# Patient Record
Sex: MALE | Race: WHITE
[De-identification: names, ages, dates, MRNs, and addresses within clinical notes are randomized per-mention and may not be internally consistent; named-entity substitution may affect disease eponyms.]

---

## 2017-04-11 ENCOUNTER — HOSPITAL ENCOUNTER (OUTPATIENT)
Dept: HOSPITAL 45 - X.SURG | Age: 82
Discharge: HOME | End: 2017-04-11
Attending: OPHTHALMOLOGY
Payer: COMMERCIAL

## 2017-04-11 VITALS
BODY MASS INDEX: 29.79 KG/M2 | WEIGHT: 185.39 LBS | WEIGHT: 185.39 LBS | HEIGHT: 65.98 IN | HEIGHT: 65.98 IN | BODY MASS INDEX: 29.79 KG/M2

## 2017-04-11 VITALS — TEMPERATURE: 97.52 F

## 2017-04-11 VITALS — HEART RATE: 66 BPM | DIASTOLIC BLOOD PRESSURE: 66 MMHG | SYSTOLIC BLOOD PRESSURE: 167 MMHG | OXYGEN SATURATION: 94 %

## 2017-04-11 DIAGNOSIS — Z90.89: ICD-10-CM

## 2017-04-11 DIAGNOSIS — H26.9: Primary | ICD-10-CM

## 2017-04-11 DIAGNOSIS — Z96.653: ICD-10-CM

## 2017-04-11 DIAGNOSIS — I10: ICD-10-CM

## 2017-04-11 RX ADMIN — CYCLOPENTOLATE HYDROCHLORIDE SCH DROP: 10 SOLUTION/ DROPS OPHTHALMIC at 07:59

## 2017-04-11 RX ADMIN — GATIFLOXACIN SCH DROP: 5 SOLUTION/ DROPS OPHTHALMIC at 08:01

## 2017-04-11 RX ADMIN — GATIFLOXACIN SCH DROP: 5 SOLUTION/ DROPS OPHTHALMIC at 08:10

## 2017-04-11 RX ADMIN — CYCLOPENTOLATE HYDROCHLORIDE SCH DROP: 10 SOLUTION/ DROPS OPHTHALMIC at 08:05

## 2017-04-11 RX ADMIN — KETOROLAC TROMETHAMINE SCH DROP: 5 SOLUTION OPHTHALMIC at 08:00

## 2017-04-11 RX ADMIN — KETOROLAC TROMETHAMINE SCH DROP: 5 SOLUTION OPHTHALMIC at 08:06

## 2017-04-11 RX ADMIN — TROPICAMIDE SCH DROP: 10 SOLUTION/ DROPS OPHTHALMIC at 07:58

## 2017-04-11 RX ADMIN — PHENYLEPHRINE HYDROCHLORIDE SCH DROP: 25 SOLUTION/ DROPS OPHTHALMIC at 08:03

## 2017-04-11 RX ADMIN — TROPICAMIDE SCH DROP: 10 SOLUTION/ DROPS OPHTHALMIC at 08:04

## 2017-04-11 RX ADMIN — PHENYLEPHRINE HYDROCHLORIDE SCH DROP: 25 SOLUTION/ DROPS OPHTHALMIC at 07:57

## 2017-04-11 NOTE — ANESTHESIA PROGRESS NT - [facility]
Anesthesia Post Op Note


Date & Time


Apr 11, 2017 at 10:27





Vital Signs


Pain Intensity:  0





 Vital Signs Past 12 Hours








  Date Time  Temp Pulse Resp B/P Pulse Ox O2 Delivery O2 Flow Rate FiO2


 


4/11/17 09:53  66 16 167/66 94 Room Air  


 


4/11/17 09:26 36.4 61 14 152/86 94 Room Air  


 


4/11/17 07:55 36.5 65 18 164/99 95 Room Air  











Notes


Mental Status:  alert / awake / arousable, participated in evaluation


Pt Amnestic to Procedure:  Yes


Nausea / Vomiting:  adequately controlled


Pain:  adequately controlled


Airway Patency, RR, SpO2:  stable & adequate


BP & HR:  stable & adequate


Hydration State:  stable & adequate


Anesthetic Complications:  no major complications apparent

## 2017-04-11 NOTE — DISCHARGE INSTRUCTIONS-SURGCTR
Discharge Instructions


Date of Service


Apr 11, 2017.





Visit


Reason for Visit:  Cataract Left Eye





Discharge


Discharge Diagnosis / Problem:  cataract





Discharge Goals


Goal(s):  Improve function





Activity Recommendations


Activity Limitations:  per Instructions/Follow-up section





Anesthesia


.





Post Anesthesia Instructions:





If you have had General Anesthesia or IV Sedation:





*  Do not drive today.


*  Resume driving when surgeon permits.


*  Do not make important decisions or sign legal documents today.


*  Call surgeon for:





   1.  Temperature elevations greater than 101 degrees F.


   2.  Uncontrollable pain.


   3.  Excessive bleeding.


   4.  Persistent nausea and vomiting.


   5.  Medication intolerance (nausea, vomiting or rash).





*  For nausea and vomiting use only clear liquids such as: tea, soda, bouillon 

until nausea subsides, then gradually increase diet as tolerated.





*  If you have any concerns or questions, call your surgeon's office.  If 

physician is unavailable and it is an emergency, call 911 or go to the nearest 

emergency room.





.





Instructions / Follow-Up


Instructions / Follow-Up





ACTIVITY RECOMMENDATIONS:





*  No strenuous lifting, jogging or running for 4 days





*  No swimming or yard work for 1 week.





*  Limited bending is permitted, such as putting on shoes.








RETURN TO SCHOOL/WORK:





No work until seen by physician in office.








MEDICATIONS:





Resume previous medications unless instructed otherwise by your surgeon.  This


includes eye drops for glaucoma.





Zymaxid/Gatifloxacin (tan cap) - one drop every 2 hours until bedtime





Nevanac/Ilevro/Prolensa/Ketorolac (gray cap) - one drop every 4 hours until 

bedtime





Prednisolone (white/pink cap, SHAKE WELL) - one drop every 2 hours until bedtime





Starting tomorrow - all 3 drops every 4 hours until seen in the office





Optive drops - as needed for discomfort








SPECIAL CARE INSTRUCTIONS:





*  Wear eyeshield when sleeping, for four nights.





*  You may wear your own glasses or sunglasses while awake.





*  You may read or watch TV





*  You may shower and wash your face, but be gentle around the eye and pat dry.





*  Blurry vision and mild irritation are normal.





*  Call office if pain is more severe or vision becomes dark at [phone].








FOLLOW UP VISIT:





Follow-up with Dr Boateng tomorrow.





Diet Recommendations


Home Diet:  resume previous diet





Procedures


Procedures Performed:  


Left Cataract Phacoemulsification With Intraocular Lens Implant





Pending Studies


Studies pending at discharge:  no





Medical Emergencies








.


Who to Call and When:





Medical Emergencies:  If at any time you feel your situation is an emergency, 

please call 911 immediately.





.





Non-Emergent Contact


Non-Emergency issues call your:  Ophthalmologist





.


.





"Provider Documentation" section prepared by Elijah Boateng.

## 2017-04-11 NOTE — [facility] OPERATIVE REPORT
Operative Report


Date of Service


Apr 11, 2017.





Operative Report


1.  PREOPERATIVE DIAGNOSIS:  Cataract of the left eye.





2.  POSTOPERATIVE DIAGNOSIS:  Same.





3.  PROCEDURE:  Phacoemulsification with intraocular lens implantation of the 

left eye.  





SURGEON:  Dr. Elijah Boateng.  ANESTHESIA:  Topical Lidocaine gel, 1% Non-

Preserved intracameral Lidocaine, and monitored intravenous sedation.  





INDICATIONS FOR THE PROCEDURE:  The patient is a 81 - year-old male with a 

history of cataract of the left eye causing significant visual impairment.  The 

details of the proposed procedure were explained to the patient who asked 

appropriate questions and following discussion of all risks, benefits and 

alternatives agreed to have the procedure done.  





4.  OPERATION AND FINDINGS:  





DESCRIPTION OF PROCEDURE:  After informed consent was obtained, the patient was 

brought to the Operating Room at the [facility].  The 

patient was placed in a supine position and then the left eye was prepped and 

draped in the usual sterile fashion for intraocular surgery.  A drop of topical 

Lidocaine gel was placed in the operative eye.  A wire lid speculum was then 

placed in the fornices.  A corneal paracentesis was then created temporally.  

The Non-Preserved Lidocaine was then instilled into the anterior chamber.  The 

anterior chamber was then pressurized with viscoelastic.  A 2.0 mm clear 

corneal incision was then created temporally.  A cystotome was inserted into 

the anterior chamber and used to create a tear in the anterior lens capsule.  

This capsular tear was then used to create a small flap and the flap was 

dragged in a counterclockwise direction in order to create a continuous 

curvilinear capsulorrhexis.  Hydrodissection was accomplished with balanced 

salt solution.  Phacoemulsification of the lens nucleus was then performed in a 

standard divide-and-conquer technique.  The phaco time was 23 seconds with an 

average power of 10 %.  The remaining cortical material was removed using 

irrigation aspiration.  The capsular bag was then filled with viscoelastic.  A 

Bausch & Lomb MI60L +22.5 diopters lens was then loaded into the injector and 

injected into the capsular bag.  The remaining viscoelastic was removed with 

the irrigation aspiration handpiece.  The wound was hydrated and then checked 

and found to be watertight.  The intraocular pressure was checked and found to 

be adequate.  The wire lid speculum was removed and the patient's face was 

cleaned and dried.  TobraDex ointment was placed in the inferior fornix.  The 

patient was discharged to the Recovery Room having tolerated the procedure 

well.  There were no complications.  The patient will be seen tomorrow in the 

office for follow-up.


I attest to the content of the Intraoperative Record and any orders documented 

therein.  Any exceptions are noted below.

## 2017-05-02 ENCOUNTER — HOSPITAL ENCOUNTER (OUTPATIENT)
Dept: HOSPITAL 45 - X.SURG | Age: 82
Discharge: HOME | End: 2017-05-02
Attending: OPHTHALMOLOGY
Payer: COMMERCIAL

## 2017-05-02 VITALS — TEMPERATURE: 97.7 F

## 2017-05-02 VITALS
HEIGHT: 65.98 IN | BODY MASS INDEX: 29.79 KG/M2 | WEIGHT: 185.39 LBS | HEIGHT: 65.98 IN | BODY MASS INDEX: 29.79 KG/M2 | WEIGHT: 185.39 LBS

## 2017-05-02 VITALS — HEART RATE: 59 BPM | SYSTOLIC BLOOD PRESSURE: 134 MMHG | DIASTOLIC BLOOD PRESSURE: 82 MMHG | OXYGEN SATURATION: 95 %

## 2017-05-02 DIAGNOSIS — H26.9: Primary | ICD-10-CM

## 2017-05-02 RX ADMIN — CYCLOPENTOLATE HYDROCHLORIDE SCH DROP: 10 SOLUTION/ DROPS OPHTHALMIC at 09:53

## 2017-05-02 RX ADMIN — GATIFLOXACIN SCH DROP: 5 SOLUTION/ DROPS OPHTHALMIC at 10:02

## 2017-05-02 RX ADMIN — PHENYLEPHRINE HYDROCHLORIDE SCH DROP: 25 SOLUTION/ DROPS OPHTHALMIC at 09:51

## 2017-05-02 RX ADMIN — TROPICAMIDE SCH DROP: 10 SOLUTION/ DROPS OPHTHALMIC at 09:52

## 2017-05-02 RX ADMIN — GATIFLOXACIN SCH DROP: 5 SOLUTION/ DROPS OPHTHALMIC at 09:55

## 2017-05-02 RX ADMIN — KETOROLAC TROMETHAMINE SCH DROP: 5 SOLUTION OPHTHALMIC at 09:59

## 2017-05-02 RX ADMIN — PHENYLEPHRINE HYDROCHLORIDE SCH DROP: 25 SOLUTION/ DROPS OPHTHALMIC at 09:56

## 2017-05-02 RX ADMIN — KETOROLAC TROMETHAMINE SCH DROP: 5 SOLUTION OPHTHALMIC at 09:54

## 2017-05-02 RX ADMIN — CYCLOPENTOLATE HYDROCHLORIDE SCH DROP: 10 SOLUTION/ DROPS OPHTHALMIC at 09:58

## 2017-05-02 RX ADMIN — TROPICAMIDE SCH DROP: 10 SOLUTION/ DROPS OPHTHALMIC at 09:57

## 2017-05-02 NOTE — [facility] OPERATIVE REPORT
Operative Report


Date of Service


May 2, 2017.





Operative Report


1.  PREOPERATIVE DIAGNOSIS:  Cataract of the right eye.





2.  POSTOPERATIVE DIAGNOSIS:  Same.





3.  PROCEDURE:  Phacoemulsification with intraocular lens implantation of the 

right eye.  





SURGEON:  Dr. Elijah Boateng.  ANESTHESIA:  Topical Lidocaine gel, 1% Non-

Preserved intracameral Lidocaine, and monitored intravenous sedation.  





INDICATIONS FOR THE PROCEDURE:  The patient is a 81 - year-old male with a 

history of cataract of the right eye causing significant visual impairment.  

The details of the proposed procedure were explained to the patient who asked 

appropriate questions and following discussion of all risks, benefits and 

alternatives agreed to have the procedure done.  





4.  OPERATION AND FINDINGS:  





DESCRIPTION OF PROCEDURE:  After informed consent was obtained, the patient was 

brought to the Operating Room at the [facility].  The 

patient was placed in a supine position and then the right eye was prepped and 

draped in the usual sterile fashion for intraocular surgery.  A drop of topical 

Lidocaine gel was placed in the operative eye.  A wire lid speculum was then 

placed in the fornices.  A corneal paracentesis was then created temporally.  

The Non-Preserved Lidocaine was then instilled into the anterior chamber.  The 

anterior chamber was then pressurized with viscoelastic.  A 2.0 mm clear 

corneal incision was then created temporally.  A cystotome was inserted into 

the anterior chamber and used to create a tear in the anterior lens capsule.  

This capsular tear was then used to create a small flap and the flap was 

dragged in a counterclockwise direction in order to create a continuous 

curvilinear capsulorrhexis.  Hydrodissection was accomplished with balanced 

salt solution.  Phacoemulsification of the lens nucleus was then performed in a 

standard divide-and-conquer technique.  The phaco time was 25 seconds with an 

average power of 10 %.  The remaining cortical material was removed using 

irrigation aspiration.  The capsular bag was then filled with viscoelastic.  A 

Bausch & Lomb MI60L +22.5 diopters lens was then loaded into the injector and 

injected into the capsular bag.  The remaining viscoelastic was removed with 

the irrigation aspiration handpiece.  The wound was hydrated and then checked 

and found to be watertight.  The intraocular pressure was checked and found to 

be adequate.  The wire lid speculum was removed and the patient's face was 

cleaned and dried.  TobraDex ointment was placed in the inferior fornix.  The 

patient was discharged to the Recovery Room having tolerated the procedure 

well.  There were no complications.  The patient will be seen tomorrow in the 

office for follow-up.


I attest to the content of the Intraoperative Record and any orders documented 

therein.  Any exceptions are noted below.

## 2017-05-02 NOTE — DISCHARGE INSTRUCTIONS-SURGCTR
Discharge Instructions


Date of Service


May 2, 2017.





Visit


Reason for Visit:  Cataract Right Eye





Discharge


Discharge Diagnosis / Problem:  cataract





Discharge Goals


Goal(s):  Improve function





Activity Recommendations


Activity Limitations:  per Instructions/Follow-up section





Anesthesia


.





Post Anesthesia Instructions:





If you have had General Anesthesia or IV Sedation:





*  Do not drive today.


*  Resume driving when surgeon permits.


*  Do not make important decisions or sign legal documents today.


*  Call surgeon for:





   1.  Temperature elevations greater than 101 degrees F.


   2.  Uncontrollable pain.


   3.  Excessive bleeding.


   4.  Persistent nausea and vomiting.


   5.  Medication intolerance (nausea, vomiting or rash).





*  For nausea and vomiting use only clear liquids such as: tea, soda, bouillon 

until nausea subsides, then gradually increase diet as tolerated.





*  If you have any concerns or questions, call your surgeon's office.  If 

physician is unavailable and it is an emergency, call 911 or go to the nearest 

emergency room.





.





Instructions / Follow-Up


Instructions / Follow-Up





ACTIVITY RECOMMENDATIONS:





*  No strenuous lifting, jogging or running for 4 days





*  No swimming or yard work for 1 week.





*  Limited bending is permitted, such as putting on shoes.








RETURN TO SCHOOL/WORK:





No work until seen by physician in office.








MEDICATIONS:





Resume previous medications unless instructed otherwise by your surgeon.  This


includes eye drops for glaucoma.





Zymaxid/Gatifloxacin (tan cap) - one drop every 2 hours until bedtime





Nevanac/Ilevro/Prolensa/Ketorolac (gray cap) - one drop every 4 hours until 

bedtime





Prednisolone (white/pink cap, SHAKE WELL) - one drop every 2 hours until bedtime





Starting tomorrow - all 3 drops every 4 hours until seen in the office





Optive drops - as needed for discomfort








SPECIAL CARE INSTRUCTIONS:





*  Wear eyeshield when sleeping, for four nights.





*  You may wear your own glasses or sunglasses while awake.





*  You may read or watch TV





*  You may shower and wash your face, but be gentle around the eye and pat dry.





*  Blurry vision and mild irritation are normal.





*  Call office if pain is more severe or vision becomes dark at [phone].








FOLLOW UP VISIT:





Follow-up with Dr Boateng tomorrow.





Diet Recommendations


Home Diet:  resume previous diet





Procedures


Procedures Performed:  


Right Eye Cataract Phacoemuslification With Intraocular Lens Implant





Pending Studies


Studies pending at discharge:  no





Medical Emergencies








.


Who to Call and When:





Medical Emergencies:  If at any time you feel your situation is an emergency, 

please call 911 immediately.





.





Non-Emergent Contact


Non-Emergency issues call your:  Ophthalmologist





.


.





"Provider Documentation" section prepared by Elijah Boateng.








.

## 2018-03-13 ENCOUNTER — HOSPITAL ENCOUNTER (EMERGENCY)
Dept: HOSPITAL 45 - C.EDB | Age: 83
Discharge: HOME | End: 2018-03-13
Payer: COMMERCIAL

## 2018-03-13 VITALS
WEIGHT: 194.45 LBS | HEIGHT: 67.01 IN | HEIGHT: 67.01 IN | WEIGHT: 194.45 LBS | BODY MASS INDEX: 30.52 KG/M2 | BODY MASS INDEX: 30.52 KG/M2

## 2018-03-13 VITALS — TEMPERATURE: 97.34 F

## 2018-03-13 VITALS — OXYGEN SATURATION: 99 % | HEART RATE: 77 BPM | SYSTOLIC BLOOD PRESSURE: 121 MMHG | DIASTOLIC BLOOD PRESSURE: 88 MMHG

## 2018-03-13 DIAGNOSIS — I12.9: ICD-10-CM

## 2018-03-13 DIAGNOSIS — Z88.5: ICD-10-CM

## 2018-03-13 DIAGNOSIS — M19.90: ICD-10-CM

## 2018-03-13 DIAGNOSIS — E78.5: ICD-10-CM

## 2018-03-13 DIAGNOSIS — Z88.8: ICD-10-CM

## 2018-03-13 DIAGNOSIS — Z79.52: ICD-10-CM

## 2018-03-13 DIAGNOSIS — N18.3: ICD-10-CM

## 2018-03-13 DIAGNOSIS — Z96.653: ICD-10-CM

## 2018-03-13 DIAGNOSIS — M32.9: ICD-10-CM

## 2018-03-13 DIAGNOSIS — I35.0: ICD-10-CM

## 2018-03-13 DIAGNOSIS — G89.29: ICD-10-CM

## 2018-03-13 DIAGNOSIS — Z79.899: ICD-10-CM
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
## 2018-03-13 DIAGNOSIS — S39.92XA: Primary | ICD-10-CM
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
## 2018-03-13 NOTE — EMERGENCY ROOM VISIT NOTE
ED Visit Note


First contact with patient:  12:25


I have seen and examined this patient with Cesia Shaw and generally agree 

with the treatment plan as discussed.


Problem List


Medical Problems:


(1) Aortic stenosis


Status: Chronic  





(2) Chronic back pain


Status: Chronic  





(3) CKD (chronic kidney disease), stage III


Status: Chronic  





(4) History of steroid therapy


Status: Chronic  





(5) Hyperlipidemia


Status: Chronic  





(6) Hypertension


Status: Chronic  





(7) Osteoarthritis


Status: Chronic  





(8) Pericarditis


Status: Resolved  





(9) Systemic lupus erythematosus


Status: Chronic  





Surgical Problems:


(1) History of back surgery


Status: Chronic  





(2) S/P appendectomy


Status: Chronic  





(3) S/p total knee replacement, bilateral


Status: Chronic  











Current/Historical Medications


Scheduled


Bromfenac Sodium (Ophth) (Prolensa), 1 DROP OPL DAILY


Carvedilol (Coreg), 3.125 MG PO BID


Cholecalciferol (Vitamin D-3), 400 MG PO QAM


Duloxetine HCl (Cymbalta), 1 CAP PO HS


Folic Acid (Folic Acid), 400 MCG PO QAM


Prednisolone Acetate (Ophth) (Pred Forte 1% Oph), 1 DROPS OPL TID


Prednisone (Prednisone), 2 TAB PO QAM


Rosuvastatin Calcium (Crestor), 20 MG PO HS


Sennosides-Docusate Sodium (Senna-S), 3 TABS PO QPM


Terazosin Hcl (Hytrin), 2 MG PO HS





Scheduled PRN


Artificial Tears (Artificial Tears), 1 DROPS OP QID PRN


Celecoxib (Celebrex), 1 CAP PO QAM PRN for Pain


Hydrocodone/Acetaminophen 5MG/325MG (Norco 5MG/325MG), 1-2 TAB PO Q4 PRN for 

Pain


Lorazepam (Lorazepam), 1 MG PO Q6H PRN for Anxiety


Thiamine Hcl (Vitamin B-1), 50 MG PO QAM PRN for PRN





Allergies


Coded Allergies:  


     Oxycodone (Verified  Allergy, Unknown, ITCHY, Per pt-can take Vicodin, 5/2/ 17)


     Piroxicam (Verified  Adverse Reaction, Intermediate, CAUSED ULCER, 5/2/17)





Vital Signs











  Date Time  Temp Pulse Resp B/P (MAP) Pulse Ox O2 Delivery O2 Flow Rate FiO2


 


3/13/18 12:01 36.3 80 20 128/71 93 Room Air  











Departure Information


Referrals


No Doctor, Assigned (PCP)





Patient Instructions


Formerly [facility], [facility]–1983

## 2018-03-13 NOTE — DIAGNOSTIC IMAGING REPORT
L-SPINE MIN 4 VIEWS ROUTINE, THORACIC SPINE 3 VIEWS ROUTINE



HISTORY:  82 years-old Male fall, eval hardware acute back pain status post

fall. History of prior thoracic and lumbar spine fusion



COMPARISON: Lumbar spine radiographs 8/27/2015



TECHNIQUE: 5 views of the lumbar spine and 3 views of the thoracic spine



FINDINGS: 



THORACIC:

 12 thoracic type rib-bearing vertebral segments are present. Fusion hardware

extends from T11-L5. Severe intervertebral disc space narrowing at T8-T9.

Multilevel intervertebral disc space narrowing and endplate spurring throughout

the imaged cervical and thoracic spine. No acute fracture or subluxation. The

imaged hardware appears intact. Heart appears enlarged. Mild right hemidiaphragm

elevation. Atherosclerosis of the aorta.



LUMBAR:

Posterior jesica screw fusion hardware extends from T11-L5. Discectomy changes are

noted at L2-L3, L3-L4 and L5-S1. There is 8 mm anterolisthesis L5 on S1 which

appears unchanged from fluoroscopic images 8/27/2015. Alignment is otherwise

satisfactory. No acute fracture or subluxation is identified. The fusion

hardware appears intact. Multilevel endplate spurring and facet arthrosis.

Indeterminate round 10 mm calcification projects over the left upper abdomen.

Moderate stool volume throughout the colon. Atherosclerosis and tortuosity of

the aorta.



 IMPRESSION: 

1. No acute fracture or subluxation of the thoracic or lumbar spine.

2. Posterior jesica and screw fusion hardware of the thoracolumbar spine with

unchanged alignment. No evidence of hardware complication.







The above report was generated using voice recognition software. It may contain

grammatical, syntax or spelling errors.







Electronically signed by:  Ricardo Hinojosa M.D.

3/13/2018 2:19 PM



Dictated Date/Time:  3/13/2018 2:13 PM

## 2018-03-13 NOTE — EMERGENCY ROOM VISIT NOTE
ED Visit Note


First contact with patient:  12:25


CHIEF COMPLAINT:  Low back pain





HISTORY OF PRESENT ILLNESS:  This 82-year-old male patient presents to the 

emergency department by private vehicle complaining of pain in the low back 

which began approximately 10 days ago after a fall.  The patient states that he 

slipped on some ice and fell straight back onto his lower back.  He denies 

hitting his head and did not have loss of consciousness, denies any other 

injuries associated with the fall.  The pain has been gradual in onset, is now 

constant and worse with movement.  The patient states his pain became more 

severe last night, stating he woke up in the middle of the night having bad 

muscle spasms causing his pain.  The patient notes the pain as throbbing and 

spasm-like and a 5/10 currently.  The patient has taken Norco with good relief 

of the pain, he states he has this for chronic pain.  The patient denies any 

loss of control of their bowel or bladder functions.  There has been no leg 

numbness or weakness, and no change in sensation.  No nausea or vomiting or 

abdominal pain.  No chest pain or shortness of breath.  The patient has had 

multiple prior back surgeries, with lumbar and thoracic fusions.  No dysuria or 

increased urinary frequency.





REVIEW OF SYSTEMS:   A review of systems was performed with positives and 

pertinent negatives listed in the history of present illness. All other systems 

were reviewed and are negative.





ALLERGIES: Reviewed in chart





MEDICATIONS: Reviewed in chart





PMH: Reviewed in chart





SOCIAL HISTORY:    Lives at home.  Denies tobacco use, alcohol or recreational 

drug use.


  


PHYSICAL EXAM: 


VITALS: Vitals are noted on the nurse's note and reviewed by myself.  Vital 

signs stable.


GENERAL: Pleasant and cooperative, in no acute distress but does appear in some 

pain, non-diaphoretic, well-developed well-nourished.


SKIN: The skin was without rashes, erythema, edema, or bruising.  Capillary 

refill less than 2 seconds.    


NECK: Supple without nuchal rigidity.  No cervical spine tenderness.  No 

paraspinous muscle tenderness.    


HEART: Regular rate and rhythm without murmurs gallops or rubs.


LUNGS: Clear to auscultation bilaterally without wheezes, rales or rhonchi.  


ABDOMEN: Positive bowel sounds x 4.  Normal tympanic percussion.  Soft, 

nontender, without masses or organomegaly.  Field sign negative.


MUSCULOSKELETAL: No muscle atrophy, erythema, or edema noted of the back.  

There is no tenderness over the lumbar spinous processes.  There is mild 

tenderness over the paraspinous muscles bilaterally.  There is no tenderness 

over the thoracic spine, but mild tenderness of the drastic paraspinous 

muscles.  There are some muscle spasms present.  The patient is slow to move 

around with maximum tenderness with bending and twisting the back.  Negative 

straight leg raise test  Bilaterally.


NEURO: Patient was alert and oriented to person place and time.  Normal 

sensation to light and sharp touch.  Deep tendon reflexes 2+ in the lower 

extremities.  Dorsalis pedis pulse 2+ bilaterally.  Strength 5/5 and equal in 

the bilateral lower extremities.





EMERGENCY DEPARTMENT COURSE: I examined the patient.  Differential diagnosis 

includes lumbar sprain/strain, contusion, vertebral fracture, subluxation, 

disruption of surgical hardware, among others.  X-rays of the thoracic and 

lumbar spine were obtained, these were reviewed by myself and radiologist and 

show no acute abnormalities and intact surgical hardware.  The patient declined 

anything for pain, stating he will continue taking his Norco at home as needed.

  He was encouraged to follow-up with his orthopedic spine surgeon for further 

management of his back pain.  He was also given strict return precautions 

should his symptoms worsen, he verbalized understanding.  Patient was 

discharged home in stable condition and ambulatory.





Patient was discussed with Dr. Trejo, who also evaluated the patient and 

agrees with my assessment and plan.


Problem List


Medical Problems:


(1) Aortic stenosis


Status: Chronic  





(2) Chronic back pain


Status: Chronic  





(3) CKD (chronic kidney disease), stage III


Status: Chronic  





(4) History of steroid therapy


Status: Chronic  





(5) Hyperlipidemia


Status: Chronic  





(6) Hypertension


Status: Chronic  





(7) Osteoarthritis


Status: Chronic  





(8) Pericarditis


Status: Resolved  





(9) Systemic lupus erythematosus


Status: Chronic  





Surgical Problems:


(1) History of back surgery


Status: Chronic  





(2) S/P appendectomy


Status: Chronic  





(3) S/p total knee replacement, bilateral


Status: Chronic  











Current/Historical Medications


Scheduled


Carvedilol (Coreg), 3.125 MG PO BID


Cholecalciferol (Vitamin D-3), 400 MG PO QAM


Duloxetine HCl (Cymbalta), 20 MG PO HS


Folic Acid (Folic Acid), 400 MCG PO QAM


Methotrexate (Methotrexate), 7.5 MG PO WK


Prednisolone Acetate (Ophth) (Pred Forte 1% Oph), 1 DROPS OPL TID


Prednisone (Prednisone), 5 MG PO QAM


Rosuvastatin Calcium (Crestor), 20 MG PO HS


Sennosides-Docusate Sodium (Senna-S), 3 TABS PO QPM


Terazosin Hcl (Hytrin), 2 MG PO HS





Scheduled PRN


Lorazepam (Lorazepam), 1 MG PO Q6H PRN for Anxiety


Thiamine Hcl (Vitamin B-1), 50 MG PO QAM PRN for PRN





Allergies


Coded Allergies:  


     Oxycodone (Verified  Allergy, Unknown, ITCHY, Per pt-can take Vicodin, 3/13

/18)


     Piroxicam (Verified  Adverse Reaction, Intermediate, CAUSED ULCER, 3/13/18)





Vital Signs











  Date Time  Temp Pulse Resp B/P (MAP) Pulse Ox O2 Delivery O2 Flow Rate FiO2


 


3/13/18 14:30  77 18 121/88 99 Room Air  


 


3/13/18 12:01 36.3 80 20 128/71 93 Room Air  











Departure Information


Impression





 Primary Impression:  


 Back injury





Dispostion


Home / Self-Care





Condition


GOOD





Referrals


No Doctor, Assigned (PCP)





Patient Instructions


ED Back Care Tips, ED Low Back Pain Injury, [facility]





Additional Instructions





Take it easy for the next few days, no strenuous activity, heavy lifting, or 

bending/twisting motions, to allow your back to rest.





Alternate heat and ice for comfort. After heat, you may do gentle stretching 

and massage to the low back. 





Take your own prescribed pain medication as directed as needed for pain.





You may also take extra strength Tylenol (500 mg tablets) 1-2 tablets every 8 

hours as needed for pain, not more than 6 tablets in 24 hours.





Follow up with your orthopedic spine surgeon in the next few days for further 

management.





Please return to the ER if any problems with bowel or bladder function, 

numbness in your groin, high fevers, severe abdominal pain or worsening back 

pain, or if loss of feeling/movement of legs.





Problem Qualifiers








 Primary Impression:  


 Back injury


 Encounter type:  initial encounter  Qualified Codes:  S39.92XA - Unspecified 

injury of lower back, initial encounter